# Patient Record
Sex: MALE | Race: WHITE | Employment: OTHER | URBAN - METROPOLITAN AREA
[De-identification: names, ages, dates, MRNs, and addresses within clinical notes are randomized per-mention and may not be internally consistent; named-entity substitution may affect disease eponyms.]

---

## 2017-05-13 PROBLEM — J21.9 ACUTE BRONCHIOLITIS: Status: ACTIVE | Noted: 2017-05-13

## 2017-05-13 PROBLEM — I48.91 ATRIAL FIBRILLATION WITH RVR (HCC): Status: ACTIVE | Noted: 2017-05-13

## 2017-05-13 PROBLEM — I48.0 PAF (PAROXYSMAL ATRIAL FIBRILLATION) (HCC): Chronic | Status: ACTIVE | Noted: 2017-05-13

## 2017-05-13 PROBLEM — R06.02 SOB (SHORTNESS OF BREATH): Status: ACTIVE | Noted: 2017-05-13

## 2017-07-17 ENCOUNTER — HOSPITAL ENCOUNTER (OUTPATIENT)
Dept: GENERAL RADIOLOGY | Age: 67
Discharge: HOME | End: 2017-07-17
Attending: INTERNAL MEDICINE | Admitting: INTERNAL MEDICINE

## 2017-07-17 LAB
ANION GAP SERPL CALCULATED.3IONS-SCNC: 14 MMOL/L (ref 4–16)
APTT: 27.7 SECONDS (ref 21.2–33)
BASOPHILS ABSOLUTE: 0.1 K/CU MM
BASOPHILS RELATIVE PERCENT: 0.6 % (ref 0–1)
BUN BLDV-MCNC: 24 MG/DL (ref 6–23)
CHLORIDE BLD-SCNC: 103 MMOL/L (ref 99–110)
CO2: 27 MMOL/L (ref 21–32)
CREAT SERPL-MCNC: 1 MG/DL (ref 0.9–1.3)
DIFFERENTIAL TYPE: ABNORMAL
EOSINOPHILS ABSOLUTE: 0 K/CU MM
EOSINOPHILS RELATIVE PERCENT: 0.5 % (ref 0–3)
GFR AFRICAN AMERICAN: >60 ML/MIN/1.73M2
GFR NON-AFRICAN AMERICAN: >60 ML/MIN/1.73M2
HCT VFR BLD CALC: 44.3 % (ref 42–52)
HEMOGLOBIN: 14.9 GM/DL (ref 13.5–18)
IMMATURE NEUTROPHIL %: 0.5 % (ref 0–0.43)
INR BLD: 1.67 INDEX
LYMPHOCYTES ABSOLUTE: 1.8 K/CU MM
LYMPHOCYTES RELATIVE PERCENT: 23.1 % (ref 24–44)
MCH RBC QN AUTO: 31.8 PG (ref 27–31)
MCHC RBC AUTO-ENTMCNC: 33.6 % (ref 32–36)
MCV RBC AUTO: 94.7 FL (ref 78–100)
MONOCYTES ABSOLUTE: 0.6 K/CU MM
MONOCYTES RELATIVE PERCENT: 7.2 % (ref 0–4)
NUCLEATED RBC %: 0 %
PDW BLD-RTO: 13.2 % (ref 11.7–14.9)
PLATELET # BLD: 257 K/CU MM (ref 140–440)
PMV BLD AUTO: 11.6 FL (ref 7.5–11.1)
POTASSIUM SERPL-SCNC: 4.3 MMOL/L (ref 3.5–5.1)
PROTHROMBIN TIME: 19.4 SECONDS (ref 9.12–12.5)
RBC # BLD: 4.68 M/CU MM (ref 4.6–6.2)
SEGMENTED NEUTROPHILS ABSOLUTE COUNT: 5.4 K/CU MM
SEGMENTED NEUTROPHILS RELATIVE PERCENT: 68.1 % (ref 36–66)
SODIUM BLD-SCNC: 144 MMOL/L (ref 135–145)
TOTAL IMMATURE NEUTOROPHIL: 0.04 K/CU MM
TOTAL NUCLEATED RBC: 0 K/CU MM
WBC # BLD: 7.9 K/CU MM (ref 4–10.5)

## 2017-07-18 ENCOUNTER — HOSPITAL ENCOUNTER (OUTPATIENT)
Dept: CARDIOLOGY | Age: 67
Discharge: HOME | End: 2017-07-18
Attending: INTERNAL MEDICINE | Admitting: INTERNAL MEDICINE

## 2017-07-18 VITALS
OXYGEN SATURATION: 100 % | HEART RATE: 75 BPM | DIASTOLIC BLOOD PRESSURE: 77 MMHG | SYSTOLIC BLOOD PRESSURE: 106 MMHG | TEMPERATURE: 98.2 F | RESPIRATION RATE: 18 BRPM

## 2017-07-18 LAB
LV EF: 50 %
LVEF MODALITY: NORMAL

## 2017-07-21 LAB
EKG ATRIAL RATE: 131 BPM
EKG DIAGNOSIS: NORMAL
EKG Q-T INTERVAL: 388 MS
EKG QRS DURATION: 88 MS
EKG QTC CALCULATION (BAZETT): 461 MS
EKG R AXIS: 1 DEGREES
EKG T AXIS: 73 DEGREES
EKG VENTRICULAR RATE: 85 BPM

## 2020-12-17 ENCOUNTER — NURSE TRIAGE (OUTPATIENT)
Dept: OTHER | Facility: CLINIC | Age: 70
End: 2020-12-17

## 2020-12-17 NOTE — TELEPHONE ENCOUNTER
Reason for Disposition   [1] HIGH RISK patient (e.g., age > 59 years, diabetes, heart or lung disease, weak immune system) AND [2] new or worsening symptoms    Answer Assessment - Initial Assessment Questions  1. COVID-19 DIAGNOSIS: \"Who made your Coronavirus (COVID-19) diagnosis? \" \"Was it confirmed by a positive lab test?\" If not diagnosed by a HCP, ask \"Are there lots of cases (community spread) where you live? \" (See public health department website, if unsure)      Unsure     2. COVID-19 EXPOSURE: \"Was there any known exposure to COVID before the symptoms began? \" [facility] Definition of close contact: within 6 feet (2 meters) for a total of 15 minutes or more over a 24-hour period. Pt was around grandson who tested positive 12/12/2020    3. ONSET: \"When did the COVID-19 symptoms start?\"     12/15/2020    4. WORST SYMPTOM: \"What is your worst symptom? \" (e.g., cough, fever, shortness of breath, muscle aches)   coughing    5. COUGH: \"Do you have a cough? \" If so, ask: \"How bad is the cough? \"     constant cough- \"bring up a little phlegm\"     6. FEVER: \"Do you have a fever? \" If so, ask: \"What is your temperature, how was it measured, and when did it start? \"  Denies    7. RESPIRATORY STATUS: \"Describe your breathing? \" (e.g., shortness of breath, wheezing, unable to speak)      Wheezing     8. BETTER-SAME-WORSE: Miley Smith you getting better, staying the same or getting worse compared to yesterday? \"  If getting worse, ask, \"In what way? \"    better since yesterday     9. HIGH RISK DISEASE: \"Do you have any chronic medical problems? \" (e.g., asthma, heart or lung disease, weak immune system, obesity, etc.)  A-fib, pacemaker , HTN     10. PREGNANCY: \"Is there any chance you are pregnant? \" \"When was your last menstrual period? \"      Denies    11. OTHER SYMPTOMS: \"Do you have any other symptoms? \"  (e.g., chills, fatigue, headache, loss of smell or taste, muscle pain, sore throat; new loss of smell or taste especially support

## 2021-11-18 ENCOUNTER — HOSPITAL ENCOUNTER (OUTPATIENT)
Dept: LAB | Age: 71
Discharge: HOME | End: 2021-11-18
Payer: MEDICARE

## 2021-11-18 LAB
SARS-COV-2: NOT DETECTED
SOURCE: NORMAL

## 2021-11-18 PROCEDURE — U0005 INFEC AGEN DETEC AMPLI PROBE: HCPCS

## 2021-11-18 PROCEDURE — U0003 INFECTIOUS AGENT DETECTION BY NUCLEIC ACID (DNA OR RNA); SEVERE ACUTE RESPIRATORY SYNDROME CORONAVIRUS 2 (SARS-COV-2) (CORONAVIRUS DISEASE [COVID-19]), AMPLIFIED PROBE TECHNIQUE, MAKING USE OF HIGH THROUGHPUT TECHNOLOGIES AS DESCRIBED BY CMS-2020-01-R: HCPCS

## 2021-11-24 ENCOUNTER — HOSPITAL ENCOUNTER (OUTPATIENT)
Dept: PULMONOLOGY | Age: 71
Discharge: HOME | End: 2021-11-24
Payer: MEDICARE

## 2021-11-24 DIAGNOSIS — J44.9 OBSTRUCTIVE CHRONIC BRONCHITIS WITHOUT EXACERBATION (HCC): ICD-10-CM

## 2021-11-24 LAB
DLCO %PRED: 88 %
DLCO PRED: NORMAL
DLCO/VA %PRED: NORMAL
DLCO/VA PRED: NORMAL
DLCO/VA: NORMAL
DLCO: NORMAL
EXPIRATORY TIME: NORMAL
FEF 25-75% %PRED-PRE: NORMAL
FEF 25-75% PRED: NORMAL
FEF 25-75%-PRE: NORMAL
FEV1 %PRED-PRE: 66 %
FEV1 PRED: NORMAL
FEV1/FVC %PRED-PRE: NORMAL
FEV1/FVC PRED: NORMAL
FEV1/FVC: 78 %
FEV1: NORMAL
FVC %PRED-PRE: NORMAL
FVC PRED: NORMAL
FVC: NORMAL
GAW %PRED: NORMAL
GAW PRED: NORMAL
GAW: NORMAL
IC %PRED: NORMAL
IC PRED: NORMAL
IC: NORMAL
MVV %PRED-PRE: NORMAL
MVV PRED: NORMAL
MVV-PRE: NORMAL
PEF %PRED-PRE: NORMAL
PEF PRED: NORMAL
PEF-PRE: NORMAL
RAW %PRED: NORMAL
RAW PRED: NORMAL
RAW: NORMAL
RV %PRED: NORMAL
RV PRED: NORMAL
RV: NORMAL
SVC %PRED: NORMAL
SVC PRED: NORMAL
SVC: NORMAL
TLC %PRED: 76 %
TLC PRED: NORMAL
TLC: NORMAL
VA %PRED: NORMAL
VA PRED: NORMAL
VA: NORMAL
VTG %PRED: NORMAL
VTG PRED: NORMAL
VTG: NORMAL

## 2021-11-24 PROCEDURE — 94729 DIFFUSING CAPACITY: CPT

## 2021-11-24 PROCEDURE — 94060 EVALUATION OF WHEEZING: CPT

## 2021-11-24 PROCEDURE — 94727 GAS DIL/WSHOT DETER LNG VOL: CPT

## 2021-11-24 ASSESSMENT — PULMONARY FUNCTION TESTS
FEV1_PERCENT_PREDICTED_PRE: 66
FEV1/FVC: 78

## 2022-11-28 ENCOUNTER — HOSPITAL ENCOUNTER (OUTPATIENT)
Dept: GENERAL RADIOLOGY | Age: 72
Discharge: HOME | End: 2022-11-28
Payer: MEDICARE

## 2022-11-28 ENCOUNTER — HOSPITAL ENCOUNTER (OUTPATIENT)
Dept: PULMONOLOGY | Age: 72
Discharge: HOME | End: 2022-11-28
Payer: MEDICARE

## 2022-11-28 ENCOUNTER — HOSPITAL ENCOUNTER (OUTPATIENT)
Age: 72
Discharge: HOME | End: 2022-11-28
Payer: MEDICARE

## 2022-11-28 DIAGNOSIS — R06.02 SHORTNESS OF BREATH: ICD-10-CM

## 2022-11-28 DIAGNOSIS — R06.2 WHEEZING: ICD-10-CM

## 2022-11-28 DIAGNOSIS — R05.9 COUGH, UNSPECIFIED TYPE: ICD-10-CM

## 2022-11-28 DIAGNOSIS — R05.1 ACUTE COUGH: ICD-10-CM

## 2022-11-28 LAB
DLCO %PRED: NORMAL
DLCO PRED: NORMAL
DLCO/VA %PRED: NORMAL
DLCO/VA PRED: NORMAL
DLCO/VA: NORMAL
DLCO: 95 ML/MIN/MMHG
EXPIRATORY TIME-POST: NORMAL
EXPIRATORY TIME: NORMAL
FEF 25-75% %CHNG: NORMAL
FEF 25-75% %PRED-POST: NORMAL
FEF 25-75% %PRED-PRE: NORMAL
FEF 25-75% PRED: NORMAL
FEF 25-75%-POST: NORMAL
FEF 25-75%-PRE: NORMAL
FEV1 %PRED-POST: NORMAL
FEV1 %PRED-PRE: NORMAL
FEV1 PRED: NORMAL
FEV1-POST: 74 L
FEV1-PRE: 66 L
FEV1/FVC %PRED-POST: NORMAL
FEV1/FVC %PRED-PRE: NORMAL
FEV1/FVC PRED: NORMAL
FEV1/FVC-POST: 103 %
FEV1/FVC-PRE: 99 %
FVC %PRED-POST: NORMAL
FVC %PRED-PRE: NORMAL
FVC PRED: NORMAL
FVC-POST: 72 L
FVC-PRE: 66 L
GAW %PRED: NORMAL
GAW PRED: NORMAL
GAW: NORMAL
IC %PRED: NORMAL
IC PRED: NORMAL
IC: NORMAL
MEP: NORMAL
MIP: NORMAL
MVV %PRED-PRE: NORMAL
MVV PRED: NORMAL
MVV-PRE: NORMAL
PEF %PRED-POST: NORMAL
PEF %PRED-PRE: NORMAL
PEF PRED: NORMAL
PEF%CHNG: NORMAL
PEF-POST: NORMAL
PEF-PRE: NORMAL
RAW %PRED: NORMAL
RAW PRED: NORMAL
RAW: NORMAL
RV %PRED: NORMAL
RV PRED: NORMAL
RV: NORMAL
SVC %PRED: NORMAL
SVC PRED: NORMAL
SVC: NORMAL
TLC %PRED: NORMAL
TLC PRED: NORMAL
TLC: 70 L
VA %PRED: NORMAL
VA PRED: NORMAL
VA: NORMAL
VTG %PRED: NORMAL
VTG PRED: NORMAL
VTG: NORMAL

## 2022-11-28 PROCEDURE — 94729 DIFFUSING CAPACITY: CPT

## 2022-11-28 PROCEDURE — 94060 EVALUATION OF WHEEZING: CPT

## 2022-11-28 PROCEDURE — 94727 GAS DIL/WSHOT DETER LNG VOL: CPT

## 2022-11-28 PROCEDURE — 71046 X-RAY EXAM CHEST 2 VIEWS: CPT

## 2022-11-28 ASSESSMENT — PULMONARY FUNCTION TESTS
FVC_POST: 72
FVC_PRE: 66
FEV1_PRE: 66
FEV1/FVC_PRE: 99
FEV1_POST: 74
FEV1/FVC_POST: 103

## 2023-09-27 ENCOUNTER — HOSPITAL ENCOUNTER (EMERGENCY)
Age: 73
Discharge: HOME | End: 2023-09-27
Attending: EMERGENCY MEDICINE
Payer: MEDICARE

## 2023-09-27 ENCOUNTER — APPOINTMENT (OUTPATIENT)
Dept: GENERAL RADIOLOGY | Age: 73
End: 2023-09-27
Payer: MEDICARE

## 2023-09-27 VITALS
DIASTOLIC BLOOD PRESSURE: 89 MMHG | WEIGHT: 220 LBS | RESPIRATION RATE: 16 BRPM | HEART RATE: 70 BPM | HEIGHT: 70 IN | OXYGEN SATURATION: 96 % | TEMPERATURE: 98.4 F | SYSTOLIC BLOOD PRESSURE: 158 MMHG | BODY MASS INDEX: 31.5 KG/M2

## 2023-09-27 DIAGNOSIS — R05.1 ACUTE COUGH: Primary | ICD-10-CM

## 2023-09-27 PROCEDURE — 6370000000 HC RX 637 (ALT 250 FOR IP): Performed by: EMERGENCY MEDICINE

## 2023-09-27 PROCEDURE — 71045 X-RAY EXAM CHEST 1 VIEW: CPT

## 2023-09-27 PROCEDURE — 99283 EMERGENCY DEPT VISIT LOW MDM: CPT

## 2023-09-27 RX ORDER — MONTELUKAST SODIUM 10 MG/1
10 TABLET ORAL NIGHTLY
COMMUNITY

## 2023-09-27 RX ORDER — LOSARTAN POTASSIUM 100 MG/1
100 TABLET ORAL DAILY
COMMUNITY

## 2023-09-27 RX ORDER — ALBUTEROL SULFATE 90 UG/1
2 AEROSOL, METERED RESPIRATORY (INHALATION) EVERY 6 HOURS PRN
Qty: 4 EACH | Refills: 0 | Status: SHIPPED | OUTPATIENT
Start: 2023-09-27 | End: 2023-10-27

## 2023-09-27 RX ORDER — IPRATROPIUM BROMIDE AND ALBUTEROL SULFATE 2.5; .5 MG/3ML; MG/3ML
1 SOLUTION RESPIRATORY (INHALATION)
Status: COMPLETED | OUTPATIENT
Start: 2023-09-27 | End: 2023-09-27

## 2023-09-27 RX ORDER — OMEPRAZOLE 10 MG/1
10 CAPSULE, DELAYED RELEASE ORAL DAILY
COMMUNITY

## 2023-09-27 RX ADMIN — IPRATROPIUM BROMIDE AND ALBUTEROL SULFATE 1 DOSE: 2.5; .5 SOLUTION RESPIRATORY (INHALATION) at 04:50

## 2023-09-27 ASSESSMENT — LIFESTYLE VARIABLES
HOW MANY STANDARD DRINKS CONTAINING ALCOHOL DO YOU HAVE ON A TYPICAL DAY: PATIENT DOES NOT DRINK
HOW OFTEN DO YOU HAVE A DRINK CONTAINING ALCOHOL: NEVER

## 2023-09-27 ASSESSMENT — PAIN - FUNCTIONAL ASSESSMENT: PAIN_FUNCTIONAL_ASSESSMENT: NONE - DENIES PAIN

## 2023-09-27 NOTE — DISCHARGE INSTRUCTIONS
Drink lots of water - I personally recommend hot water with lemon and honey. Please continue with your cough medicines at home as you have been. I would recommend sitting in the bathroom with the hot steam running to help open everything up (do this at least 4 times daily). You can use the albuterol inhaler 2 puffs every 6 hours as needed  See your doctor in a couple days for reevaluation - if you are not improved by Monday, you may need to have antibiotics.

## 2023-09-27 NOTE — ED NOTES
All discharge instructions gone over and all questions answered. Pt ambulated to the front of the ED with no issues.       Mario Alvarez RN  09/27/23 [id number]

## 2023-09-29 NOTE — ED PROVIDER NOTES
CC: Cough and sore throat  Seen in room 1    HPI: This is a 74-year-old male who comes in for evaluation of cough and sore throat for a week. States he lost his voice in the last few days. He states he is getting better, just taking him quite a long time and he is frustrated by this. He has been ill for about 7 days. No chest pain, no shortness of breath, no dizziness, syncope, vomiting or diarrhea. Otherwise has been in his typical state of health. Past medical and surgical history: reviewed  Social: denied smoking, drinking, drugs  Allergies: reviewed    Physical exam:  General: awake, alert. No acute distress. Hoarse voice noted. Skin: No rashes noted. HENT: NC/AT. EOMI, PERRL. Mucous membranes moist.   Neck: Trachea midline. No lymphadenopathy  Chest: Symmetrical rise and fall of the chest. Normal work of breathing. Lungs are diminished bilaterally with a scattered wheeze   Cardio: Heart is regular rate and rhythm. Abdomen: Soft, nontender, nondistended. Back: Active ROM. Extremities: No deformities. Pulses present. Neuro: A/O x 3. No gross focal motor deficits noted. --------  Medical decision making    Differential diagnosis includes but not limited to: indirect and direct pathophysiologic etiologies vascular, inflammatory, infectious, neoplastic, degenerative, deficiency, drugs, idiopathic, intoxication, iatrogenic, congenital, autoimmune, allergic, anatomic, trauma, endocrine, environmental, and metabolic. ED course: Patient seen and evaluated by me for viral symptoms of 7 days duration. He is improving but not feeling like he is improving quick enough. His vitals are stable, his exam is fairly benign save for his notable cough. Chest x-ray showed no acute abnormalities. The patient was given a dose of DuoNeb after which she had improvement in his coughing and felt better. I sent him with an MDI inhaler to his pharmacy.   Discharge stable return precautions. Impression:  #1.   Acute moderate cough       Palma Padgett MD  09/29/23 [id number]

## 2024-06-03 ENCOUNTER — HOSPITAL ENCOUNTER (OUTPATIENT)
Dept: ULTRASOUND IMAGING | Age: 74
Discharge: HOME | End: 2024-06-03
Payer: MEDICARE

## 2024-06-03 ENCOUNTER — HOSPITAL ENCOUNTER (OUTPATIENT)
Dept: NUCLEAR MEDICINE | Age: 74
Discharge: HOME | End: 2024-06-03
Payer: MEDICARE

## 2024-06-03 VITALS — WEIGHT: 220 LBS | BODY MASS INDEX: 31.57 KG/M2

## 2024-06-03 DIAGNOSIS — R14.0 ABDOMINAL BLOATING: ICD-10-CM

## 2024-06-03 PROCEDURE — 78227 HEPATOBIL SYST IMAGE W/DRUG: CPT

## 2024-06-03 PROCEDURE — 6360000002 HC RX W HCPCS

## 2024-06-03 PROCEDURE — 76705 ECHO EXAM OF ABDOMEN: CPT

## 2024-06-03 PROCEDURE — 3430000000 HC RX DIAGNOSTIC RADIOPHARMACEUTICAL

## 2024-06-03 PROCEDURE — 2580000003 HC RX 258

## 2024-06-03 PROCEDURE — A9537 TC99M MEBROFENIN: HCPCS

## 2024-06-03 RX ADMIN — SODIUM CHLORIDE 2 MCG: 9 INJECTION, SOLUTION INTRAVENOUS at 09:45

## 2024-06-03 RX ADMIN — Medication 5 MILLICURIE: at 08:45

## 2024-07-24 ENCOUNTER — HOSPITAL ENCOUNTER (OUTPATIENT)
Dept: NUCLEAR MEDICINE | Age: 74
Discharge: HOME | End: 2024-07-24
Payer: MEDICARE

## 2024-07-24 DIAGNOSIS — R14.0 ABDOMINAL BLOATING: ICD-10-CM

## 2024-07-24 PROCEDURE — 3430000000 HC RX DIAGNOSTIC RADIOPHARMACEUTICAL

## 2024-07-24 PROCEDURE — 78264 GASTRIC EMPTYING IMG STUDY: CPT

## 2024-07-24 PROCEDURE — A9541 TC99M SULFUR COLLOID: HCPCS

## 2024-07-24 RX ADMIN — Medication 1.2 MILLICURIE: at 09:05
